# Patient Record
(demographics unavailable — no encounter records)

---

## 2025-02-19 NOTE — PHYSICAL EXAM
[Normal] : alert, normal voice/communication, healthy appearing, no acute distress [Sclera] : the sclera and conjunctiva were normal [Hearing Threshold Finger Rub Not [place]] : hearing was normal [Normal Appearance] : the appearance of the neck was normal [No Respiratory Distress] : no respiratory distress [Abdomen Tenderness] : non-tender [No Masses] : no abdominal mass palpated [Abdomen Soft] : soft [Abnormal Walk] : normal gait [Oriented To Time, Place, And Person] : oriented to person, place, and time

## 2025-02-19 NOTE — PHYSICAL EXAM
[Normal] : alert, normal voice/communication, healthy appearing, no acute distress [Sclera] : the sclera and conjunctiva were normal [Hearing Threshold Finger Rub Not [place]] : hearing was normal [Normal Appearance] : the appearance of the neck was normal [No Respiratory Distress] : no respiratory distress [Abdomen Tenderness] : non-tender [No Masses] : no abdominal mass palpated [Abdomen Soft] : soft [Abnormal Walk] : normal gait [Oriented To Time, Place, And Person] : oriented to person, place, and time

## 2025-02-23 NOTE — HISTORY OF PRESENT ILLNESS
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    Just moved to [place] in Summer . Was following with a GI provider in [place].   In  had increased work stress began to experience frequent BMs (up to 12 liquid BMs daily) with bleeding. Colonoscopy revealed inflammation. Was diagnosed with Crohn's initially, started Mesalamine PO 4 tablets daily and mesalamine enemas prn for flares. Was then told he might have ulcerative colitis.   Since started PO Mesalamine he has typically 2-3 formed BMs daily. Denies rectal bleeding, urgency. Sometimes has nocturnal BMs. Has noticed worse symptoms during the Summer or when he eats really poor and drinks in excess. Denies abd pain, nausea, vomiting, unintentional weight loss, dysphagia, heartburn. Denies EIMs. No prior steroid exposure.   Referred by: Dr. Zamudio    PMH: acquired solitary kidney, right eye blindness, anxiety, indeterminate colitis PSH: hernia repair  Family history: maternal gpa with pancreatic cancer. denies IBD Allergies: NKDA Medications: mesalamine, lexapro Supplements/OTC: iron, vit d AC or NSAIDs: denies    Colon Cancer Screenin EGD: denies  Labs:   cbc, iron sat 13% ferritin 13 creatinine 1.37. reports he has previously had elevated calprotectin  Imaging: denies    Tobacco: denies  Alcohol: 7-8 drinks/ week  Drugs: marijuana occasional  84

## 2025-02-23 NOTE — HISTORY OF PRESENT ILLNESS
[FreeTextEntry1] : 29 YO M with PMH of acquired solitary kidney, right eye blindness, anxiety, indeterminate colitis presents to establish care for IBD.    Just moved to [place] in Summer . Was following with a GI provider in [place].   In  had increased work stress began to experience frequent BMs (up to 12 liquid BMs daily) with bleeding. Colonoscopy revealed inflammation. Was diagnosed with Crohn's initially, started Mesalamine PO 4 tablets daily and mesalamine enemas prn for flares. Was then told he might have ulcerative colitis.   Since started PO Mesalamine he has typically 2-3 formed BMs daily. Denies rectal bleeding, urgency. Sometimes has nocturnal BMs. Has noticed worse symptoms during the Summer or when he eats really poor and drinks in excess. Denies abd pain, nausea, vomiting, unintentional weight loss, dysphagia, heartburn. Denies EIMs. No prior steroid exposure.   Referred by: Dr. Zamudio    PMH: acquired solitary kidney, right eye blindness, anxiety, indeterminate colitis PSH: hernia repair  Family history: maternal gpa with pancreatic cancer. denies IBD Allergies: NKDA Medications: mesalamine, lexapro Supplements/OTC: iron, vit d AC or NSAIDs: denies    Colon Cancer Screenin EGD: denies  Labs:   cbc, iron sat 13% ferritin 13 creatinine 1.37. reports he has previously had elevated calprotectin  Imaging: denies    Tobacco: denies  Alcohol: 7-8 drinks/ week  Drugs: marijuana occasional

## 2025-02-23 NOTE — ASSESSMENT
[FreeTextEntry1] : 31 YO M with PMH of acquired solitary kidney, right eye blindness, anxiety, indeterminate colitis diagnosed in 2023 (currently on Mesalamine 4 tablets daily and mesalamine enemas prn) presents today in clinical remission to establish care for IBD.  #Indeterminate Colitis - cscope 2023 with inflammation (uncertain the extent, report unavailable at this time) - order cbc, cmp, crp, nutritional labs today - order prebiologics today  - continue Mesalamine 4 tablets daily - only taking Mesalamine enemas prn during flares - has not had repeat cscope since starting therapy, no prior egd - Schedule patient for egd and colonoscopy at [facility] with Miralax prep. - Discussed R/A/I/B with patient. Education provided on preparation instructions and the need for an escort. - will request records from prior GI, Dr. Alton Robin at [facility] [phone]  #Iron Deficiency Anemia - 9/24 cbc, iron sat 13% ferritin 13 - most likely r/t IBD, however will schedule egd at time of cscope to r/o UGI bleeding - repeat iron studies today  - continue iron supplements, will need to stop 1 week prior to procedure   HCM - reports he is UTD with childhood vaccines, did not receive annual flu vaccine - denies NSAIDs - denies depression - no prior steroids  - denies tobacco, drugs. drinks 7-8 etoh drinks/week  - 9/24 hep b surface AB reactive, hep c neative  Follow up post procedure

## 2025-02-23 NOTE — ASSESSMENT
[FreeTextEntry1] : 31 YO M with PMH of acquired solitary kidney, right eye blindness, anxiety, indeterminate colitis diagnosed in 2023 (currently on Mesalamine 4 tablets daily and mesalamine enemas prn) presents today in clinical remission to establish care for IBD.  #Indeterminate Colitis - cscope 2023 with inflammation (uncertain the extent, report unavailable at this time) - order cbc, cmp, crp, nutritional labs today - order prebiologics today  - continue Mesalamine 4 tablets daily - only taking Mesalamine enemas prn during flares - has not had repeat cscope since starting therapy, no prior egd - Schedule patient for egd and colonoscopy at [facility] with Miralax prep. - Discussed R/A/I/B with patient. Education provided on preparation instructions and the need for an escort. - will request records from prior GI, Dr. Alton Robin at [facility] [phone]  #Iron Deficiency Anemia - 9/24 cbc, iron sat 13% ferritin 13 - most likely r/t IBD, however will schedule egd at time of cscope to r/o UGI bleeding - repeat iron studies today  - continue iron supplements, will need to stop 1 week prior to procedure   HCM - reports he is UTD with childhood vaccines, did not receive annual flu vaccine - denies NSAIDs - denies depression - no prior steroids  - denies tobacco, drugs. drinks 7-8 etoh drinks/week  - 9/24 hep b surface AB reactive, hep c neative  Follow up post procedure

## 2025-02-23 NOTE — END OF VISIT
[FreeTextEntry3] : Patient was seen and discussed with levi benitez Note was edited and amended as necessary Patient was physically seen at [address] [Time Spent: ___ minutes] : I have spent [unfilled] minutes of time on the encounter which excludes teaching and separately reported services.

## 2025-03-04 NOTE — REVIEW OF SYSTEMS
[Joint Pain] : joint pain [Muscle Pain] : muscle pain [Negative] : Gastrointestinal [Joint Stiffness] : no joint stiffness [Muscle Weakness] : no muscle weakness [Joint Swelling] : no joint swelling

## 2025-03-04 NOTE — PHYSICAL EXAM
[No Acute Distress] : no acute distress [Normal Sclera/Conjunctiva] : normal sclera/conjunctiva [Normal Outer Ear/Nose] : the outer ears and nose were normal in appearance [No Respiratory Distress] : no respiratory distress  [Normal] : affect was normal and insight and judgment were intact [de-identified] : Normal ROM hip and knee, normal [place], grind, Lachman, drawer, varus/valgus

## 2025-03-04 NOTE — HISTORY OF PRESENT ILLNESS
[FreeTextEntry1] : Knee pain [de-identified] : SASHA, colitis, solitary kidney here to discuss acute on chronic knee pain. Last year ran half marathon with minimal training -- was a comfortable runner beforehand. Ran fast the whole time and felt fine but after the race R knee was in pain. Did not do any further evaluation and could still exercise. Ran 10K in November. Did half marathon again this past February and had to stop during the race due to R knee pain that started 3 miles in. [place] a popping/locking sensation and could not bear weight, so hobbled around the 5 mile mandy. Walking is not painful now, but after walking a lot he starts to feel sore. Running is painful.  Still pending EGD/[place] with GI